# Patient Record
Sex: FEMALE | Race: WHITE
[De-identification: names, ages, dates, MRNs, and addresses within clinical notes are randomized per-mention and may not be internally consistent; named-entity substitution may affect disease eponyms.]

---

## 2017-03-15 ENCOUNTER — HOSPITAL ENCOUNTER (EMERGENCY)
Dept: HOSPITAL 74 - FER | Age: 67
Discharge: HOME | End: 2017-03-15
Payer: COMMERCIAL

## 2017-03-15 VITALS — BODY MASS INDEX: 26.5 KG/M2

## 2017-03-15 VITALS — DIASTOLIC BLOOD PRESSURE: 67 MMHG | HEART RATE: 85 BPM | TEMPERATURE: 98.9 F | SYSTOLIC BLOOD PRESSURE: 118 MMHG

## 2017-03-15 DIAGNOSIS — M79.604: ICD-10-CM

## 2017-03-15 DIAGNOSIS — R41.9: ICD-10-CM

## 2017-03-15 DIAGNOSIS — Z79.01: ICD-10-CM

## 2017-03-15 DIAGNOSIS — Z95.2: ICD-10-CM

## 2017-03-15 DIAGNOSIS — M79.605: ICD-10-CM

## 2017-03-15 DIAGNOSIS — M54.5: Primary | ICD-10-CM

## 2017-03-15 NOTE — PDOC
History of Present Illness





- General


Chief Complaint: Pain, Acute


Stated Complaint: BACK PAIN RADIATING DOWN BOTH LEGS


Time Seen by Provider: 03/15/17 01:39


History Source: Patient


Exam Limitations: No Limitations





- History of Present Illness


Initial Comments: 





03/15/17 01:53


This is a 66-year-old female who comes in complaining of low back pain 1 day. 

Patient said she has a history of spinal stenosis and has been in bed for the 

better part of a week for the influenza. Patient said she got up out of bed 

this evening and had the sensation that her back is went into a spasm and she 

had difficulty walking. Patient said she was unable to go upstairs unable to 

stand and unable to move secondary to the pain. However by the time patient 

arrived in the emergency room the pain and pretty much resolved and she was 

moving and feeling much better. Patient otherwise denies any loss or change in 

bowel or bladder habits. Patient denies any weakness of   her legs. Her 

legs.PAST MEDICAL HISTORY:  no significant history





PAST SURGICAL HISTORY:  no significant history





FAMILY HISTORY:  no pertinant history





SOCIAL HISTORY:  Pt lives with  family and is employed.





MEDICATIONS:  reviewed





ALLERGIES:  As per nursing notes





Review of Systems


General:  No fevers or chills, no weakness, no weight loss 


HEENT: No change in vision.  No sore throat,. No ear pain


CardioVascular:  No chest pain or shortness of breath


Respiratory:No cough, or wheezing. 


Gastrointestinal:  no nausea, vomitting, diarrhea or constipation,  No rectal 

bleeding


Genitourinary:  No dysuria, hematuria, or frequency


Musculoskeletal:  No joint or muscle pain or swelling


Neurologic: No headache, vertigo, dizziness or loss of consciousness


Psychiatric: nor depression 


Skin: No rashes or easy bruising


Endocrine: no increased thirst or abnormal weight change


Allergic: no skin or latex allergy


All other systems reviewed and normal





GENERAL: The patient is awake, alert, and fully oriented, in no acute distress.


HEAD: Normal with no signs of trauma.


EYES: Pupils equal, round and reactive to light, extraocular movements intact, 

sclera anicteric, conjunctiva clear.


EXTREMITIES: Normal range of motion, no edema.


BACK: There is some tenderness on palpation bilaterally lower back and over the 

sciatic notch bilaterally. There is full range of motion of the hips and legs 

with minimal back pain. Patient denies any low back pain on straight leg 

raising. Neurovascular is intact.


NEUROLOGICAL: Normal speech, normal gait.


PSYCH: Normal mood, normal affect.


SKIN: Warm, Dry, normal turgor, no rashes or lesions noted.





Assessment and plan:


This is a 66-year-old female who comes in complaining of low back pain prior to 

arrival in the emergency room however by the time she got here her pain is 

reported much resolved. Patient was reassured that because of her spinal 

stenosis and she been laying in bed for approximately a week to that most 

likely she had moved wrong and that resulted in her pain. Patient was reassured 

that if it happens again to work through it and work it out with slow gentle 

movement and stretching. Patient was told she can take Tylenol and follow-up 

with her primary care doctor.

















Past History





- Past Medical History


Allergies/Adverse Reactions: 


 Allergies











Allergy/AdvReac Type Severity Reaction Status Date / Time


 


No Known Allergies Allergy   Verified 03/15/17 01:37











Home Medications: 


Ambulatory Orders





Aspirin [Aspirin EC] 81 mg PO DAILY 10/28/15 


Cholecalciferol (Vitamin D3) [Vitamin D] 2,000 unit PO DAILY 10/28/15 


Escitalopram Oxalate [Lexapro -] 10 mg PO DAILY 10/28/15 


Levothyroxine [Synthroid -] 137 mcg PO DAILY 10/28/15 


Metoprolol Succinate [Toprol Xl -] 50 mg PO HS 10/28/15 


Metoprolol Succinate [Toprol Xl -] 75 mg PO AM 10/28/15 


Rosuvastatin Calcium [Crestor] 10 mg PO HS 10/28/15 


Tadalafil [Adcirca] 40 mg PO DAILY 10/28/15 


Warfarin Sodium [Coumadin] 8 mg PO DAILY 10/28/15 








Cancer: Yes (h/o hodgkin's dse tx with radiation)


Cardiac Disorders: Yes


Psychiatric Problems: Yes (anxiety)


Thyroid Disease: Yes





- Surgical History


Cardiac Surgery: Yes (AORTIC VALVE REPLACEMENT 2007)





- Psycho/Social/Smoking Cessation Hx


Anxiety: No


Suicidal Ideation: No


Smoking History: Never smoked


Have you smoked in the past 12 months: No


Information on smoking cessation initiated: No


Hx Alcohol Use: Yes (WINE)


Drug/Substance Use Hx: No


Substance Use Type: None





*Physical Exam





- Vital Signs


 Last Vital Signs











Temp Pulse Resp BP Pulse Ox


 


 98.9 F   85   16   118/67   96 


 


 03/15/17 01:39  03/15/17 01:39  03/15/17 01:39  03/15/17 01:39  03/15/17 01:39














*DC/Admit/Observation/Transfer


Diagnosis at time of Disposition: 


 Low back pain radiating to both legs





- Discharge Dispostion


Disposition: HOME


Condition at time of disposition: Stable


Admit: No





- Referrals


Referrals: 


Shantelle Connolly MD [Primary Care Provider] - 





- Patient Instructions


Printed Discharge Instructions:  DI for Low Back Pain


Additional Instructions: 


 Call  your  DrKely in the morning and talk to  about being able to take any 

anti-inflammatories. Tell you  that you were having low back pain and that 

you were in the emergency room and that the doctor recommended to take an anti-

inflammatory by you were concerned because you're on Coumadin so I recommended 

that you talk to her doctor first.





If her doctor says it is okay to take an anti-inflammatory then you should take 

ibuprofen 2 tablets 3 times a day with food don't take on an empty stomach do 

this for 1 week.





In the meantime U can take acetaminophen 2 extra strength tablets every 4-6 

hours as needed.





If you're not better within 5-7 days follow-up with your doctor or an 

orthopedist. If he needed an orthopedist follow-up with Dr. Larry at [phone]





Return to the emergency department immediately with ANY new, persistent or 

worsening symptoms.





Continue any medications as previously prescribed by your physician.





You should follow up with your primary doctor as soon as possible regarding 

today's emergency department visit.


.


Please make sure your doctor reviews the results of your emergency evaluation.





Thank you for coming to the   Emergency Department today for your care. It was 

a pleasure to see you today. Please note that your evaluation is INCOMPLETE 

until you  follow-up with your doctor.

## 2017-03-15 NOTE — PDOC
History of Present Illness





- General


Chief Complaint: Pain, Acute


Stated Complaint: BACK PAIN RADIATING DOWN BOTH LEGS


Time Seen by Provider: 03/15/17 01:39


History Source: Patient


Exam Limitations: No Limitations





Past History





- Past Medical History


Allergies/Adverse Reactions: 


 Allergies











Allergy/AdvReac Type Severity Reaction Status Date / Time


 


No Known Allergies Allergy   Verified 03/15/17 01:37











Home Medications: 


Ambulatory Orders





Aspirin [Aspirin EC] 81 mg PO DAILY 10/28/15 


Cholecalciferol (Vitamin D3) [Vitamin D] 2,000 unit PO DAILY 10/28/15 


Escitalopram Oxalate [Lexapro -] 10 mg PO DAILY 10/28/15 


Levothyroxine [Synthroid -] 137 mcg PO DAILY 10/28/15 


Metoprolol Succinate [Toprol Xl -] 50 mg PO HS 10/28/15 


Metoprolol Succinate [Toprol Xl -] 75 mg PO AM 10/28/15 


Rosuvastatin Calcium [Crestor] 10 mg PO HS 10/28/15 


Tadalafil [Adcirca] 40 mg PO DAILY 10/28/15 


Warfarin Sodium [Coumadin] 8 mg PO DAILY 10/28/15 








Cancer: Yes (h/o hodgkin's dse tx with radiation)


Cardiac Disorders: Yes


Psychiatric Problems: Yes (anxiety)


Thyroid Disease: Yes





- Surgical History


Cardiac Surgery: Yes (AORTIC VALVE REPLACEMENT 2007)





- Psycho/Social/Smoking Cessation Hx


Anxiety: No


Suicidal Ideation: No


Smoking History: Never smoked


Have you smoked in the past 12 months: No


Hx Alcohol Use: Yes (WINE)


Drug/Substance Use Hx: No


Substance Use Type: None





*DC/Admit/Observation/Transfer





- Discharge Dispostion


Condition at time of disposition: Stable

## 2019-09-11 ENCOUNTER — HOSPITAL ENCOUNTER (OUTPATIENT)
Dept: HOSPITAL 74 - FASU | Age: 69
Discharge: HOME | End: 2019-09-11
Attending: OPHTHALMOLOGY
Payer: COMMERCIAL

## 2019-09-11 VITALS — HEART RATE: 78 BPM | SYSTOLIC BLOOD PRESSURE: 92 MMHG | DIASTOLIC BLOOD PRESSURE: 46 MMHG

## 2019-09-11 VITALS — BODY MASS INDEX: 24.7 KG/M2

## 2019-09-11 VITALS — TEMPERATURE: 98 F

## 2019-09-11 DIAGNOSIS — H26.8: Primary | ICD-10-CM

## 2019-09-11 PROCEDURE — 08RK3JZ REPLACEMENT OF LEFT LENS WITH SYNTHETIC SUBSTITUTE, PERCUTANEOUS APPROACH: ICD-10-PCS | Performed by: OPHTHALMOLOGY

## 2019-09-11 RX ADMIN — TROPICAMIDE ONE DROP: 10 SOLUTION/ DROPS OPHTHALMIC at 09:15

## 2019-09-11 RX ADMIN — TROPICAMIDE ONE DROP: 10 SOLUTION/ DROPS OPHTHALMIC at 09:20

## 2019-09-11 RX ADMIN — CIPROFLOXACIN HYDROCHLORIDE ONE DROP: 3 SOLUTION/ DROPS OPHTHALMIC at 09:10

## 2019-09-11 RX ADMIN — CYCLOPENTOLATE HYDROCHLORIDE ONE DROP: 20 SOLUTION/ DROPS OPHTHALMIC at 09:10

## 2019-09-11 RX ADMIN — CIPROFLOXACIN HYDROCHLORIDE ONE DROP: 3 SOLUTION/ DROPS OPHTHALMIC at 09:15

## 2019-09-11 RX ADMIN — PHENYLEPHRINE HYDROCHLORIDE ONE DROP: 0.25 SPRAY NASAL at 09:10

## 2019-09-11 RX ADMIN — CYCLOPENTOLATE HYDROCHLORIDE ONE DROP: 20 SOLUTION/ DROPS OPHTHALMIC at 09:15

## 2019-09-11 RX ADMIN — CIPROFLOXACIN HYDROCHLORIDE ONE DROP: 3 SOLUTION/ DROPS OPHTHALMIC at 09:20

## 2019-09-11 RX ADMIN — TROPICAMIDE ONE DROP: 10 SOLUTION/ DROPS OPHTHALMIC at 09:10

## 2019-09-11 RX ADMIN — CYCLOPENTOLATE HYDROCHLORIDE ONE DROP: 20 SOLUTION/ DROPS OPHTHALMIC at 09:20

## 2019-09-11 RX ADMIN — PHENYLEPHRINE HYDROCHLORIDE ONE DROP: 0.25 SPRAY NASAL at 09:15

## 2019-09-11 RX ADMIN — PHENYLEPHRINE HYDROCHLORIDE ONE DROP: 0.25 SPRAY NASAL at 09:20

## 2019-09-11 NOTE — OP
DATE OF OPERATION:  09/11/2019

 

OPERATIVE PROCEDURE:  Lens phacoemulsification with posterior chamber intraocular

lens placement left eye.

 

PREOPERATIVE DIAGNOSIS:  Visually significant cataract of left eye.

 

POSTOPERATIVE DIAGNOSIS:  Visually significant cataract of left eye.

 

SURGEON:  Jordon Peralta M.D.

 

ANESTHESIA:  MAC

 

ANESTHESIOLOGIST: 

 

PROCEDURE:  The patient was brought to the operating room and placed under monitored

anesthesia care by Anesthesia.  A drop of Tetracaine was then placed over the left

eye.  The patient was then prepped and draped in the usual sterile manner.  A

speculum was then placed over the left eye. The eye was then well irrigated with

copious amounts of BSS (balanced salt solution). 

 

The operating microscope was then moved into position.  A paracentesis was performed

using a 15 degree blade.  At this point 0.5 mL of 1% preservative-free lidocaine was

injected into the anterior chamber.  Amvisc plus was then injected into the anterior

chamber.  A clear corneal incision was then formed using a 2.2 mm keratome. A

capsulorrhexis was then performed in a continuous circular fashion beginning with a

cystotome, completed with an Utratas forceps. Hydrodissection was then performed

using BSS on a cannula. The phaco probe was then introduced through the corneal wound

and the cataract was removed using the phaco chop technique.  Approximately 3 seconds

of absolute phaco time was used.  The remaining cortex was then removed using

irrigation and aspiration with an I/A probe. The capsule was then filled with regular

Amvisc and the capsule was noted to be intact.  A previously selected foldable

posterior chamber intraocular lens was then injected into the capsule through the

corneal wound using a lens injector.  It was then dialed into position using a

Sinskey hook.  The Amvisc was then removed using irrigation and aspiration.  Miostat

was then injected through the paracentesis to constrict the pupil.  The paracentesis

and corneal wound were then hydrated and noted to be water tight. A drop of Maxitrol

was then placed over the eye.  The speculum was removed and clear shield was taped

over the eye. 

 

The patient tolerated the procedure well and there were no surgical complications.

The patient was asked to follow up in my office the next day. 

 

 

 

JORDON PERALTA M.D.

 

[id number]

DD: 09/11/2019 10:55

DT: 09/11/2019 11:56

Job #:  [id number]

## 2024-06-29 ENCOUNTER — RX ONLY (RX ONLY)
Age: 74
End: 2024-06-29

## 2024-06-29 ENCOUNTER — OFFICE (OUTPATIENT)
Dept: URBAN - METROPOLITAN AREA CLINIC 29 | Facility: CLINIC | Age: 74
Setting detail: OPHTHALMOLOGY
End: 2024-06-29
Payer: MEDICARE

## 2024-06-29 DIAGNOSIS — B00.52: ICD-10-CM

## 2024-06-29 DIAGNOSIS — Z96.1: ICD-10-CM

## 2024-06-29 PROCEDURE — 99203 OFFICE O/P NEW LOW 30 MIN: CPT | Performed by: OPHTHALMOLOGY

## 2024-06-29 ASSESSMENT — CONFRONTATIONAL VISUAL FIELD TEST (CVF)
OS_FINDINGS: FULL
OD_FINDINGS: FULL

## 2024-07-03 ENCOUNTER — OFFICE (OUTPATIENT)
Dept: URBAN - METROPOLITAN AREA CLINIC 29 | Facility: CLINIC | Age: 74
Setting detail: OPHTHALMOLOGY
End: 2024-07-03
Payer: MEDICARE

## 2024-07-03 DIAGNOSIS — B00.52: ICD-10-CM

## 2024-07-03 DIAGNOSIS — Z96.1: ICD-10-CM

## 2024-07-03 PROCEDURE — 99213 OFFICE O/P EST LOW 20 MIN: CPT | Performed by: OPHTHALMOLOGY

## 2024-07-03 ASSESSMENT — CONFRONTATIONAL VISUAL FIELD TEST (CVF)
OS_FINDINGS: FULL
OD_FINDINGS: FULL

## 2024-07-23 ENCOUNTER — OFFICE (OUTPATIENT)
Dept: URBAN - METROPOLITAN AREA CLINIC 29 | Facility: CLINIC | Age: 74
Setting detail: OPHTHALMOLOGY
End: 2024-07-23
Payer: MEDICARE

## 2024-07-23 DIAGNOSIS — H43.813: ICD-10-CM

## 2024-07-23 DIAGNOSIS — H16.422: ICD-10-CM

## 2024-07-23 DIAGNOSIS — B00.52: ICD-10-CM

## 2024-07-23 DIAGNOSIS — D31.32: ICD-10-CM

## 2024-07-23 DIAGNOSIS — H16.042: ICD-10-CM

## 2024-07-23 DIAGNOSIS — Z96.1: ICD-10-CM

## 2024-07-23 PROBLEM — H17.9 CORNEAL SCAR: Status: ACTIVE | Noted: 2024-07-23

## 2024-07-23 PROCEDURE — 99213 OFFICE O/P EST LOW 20 MIN: CPT | Performed by: OPHTHALMOLOGY

## 2024-07-23 ASSESSMENT — CONFRONTATIONAL VISUAL FIELD TEST (CVF)
OS_FINDINGS: FULL
OD_FINDINGS: FULL

## 2024-08-13 ENCOUNTER — OFFICE (OUTPATIENT)
Dept: URBAN - METROPOLITAN AREA CLINIC 30 | Facility: CLINIC | Age: 74
Setting detail: OPHTHALMOLOGY
End: 2024-08-13
Payer: MEDICARE

## 2024-08-13 DIAGNOSIS — H16.223: ICD-10-CM

## 2024-08-13 DIAGNOSIS — B00.52: ICD-10-CM

## 2024-08-13 PROCEDURE — 68761 CLOSE TEAR DUCT OPENING: CPT | Mod: 50 | Performed by: OPHTHALMOLOGY

## 2024-08-13 PROCEDURE — 92012 INTRM OPH EXAM EST PATIENT: CPT | Mod: 25 | Performed by: OPHTHALMOLOGY

## 2024-08-13 ASSESSMENT — CONFRONTATIONAL VISUAL FIELD TEST (CVF)
OD_FINDINGS: FULL
OS_FINDINGS: FULL

## 2024-10-31 ENCOUNTER — OFFICE (OUTPATIENT)
Dept: URBAN - METROPOLITAN AREA CLINIC 29 | Facility: CLINIC | Age: 74
Setting detail: OPHTHALMOLOGY
End: 2024-10-31
Payer: MEDICARE

## 2024-10-31 DIAGNOSIS — H52.4: ICD-10-CM

## 2024-10-31 DIAGNOSIS — B00.52: ICD-10-CM

## 2024-10-31 DIAGNOSIS — Z96.1: ICD-10-CM

## 2024-10-31 DIAGNOSIS — H17.9: ICD-10-CM

## 2024-10-31 PROCEDURE — 99213 OFFICE O/P EST LOW 20 MIN: CPT | Performed by: OPHTHALMOLOGY

## 2024-10-31 PROCEDURE — 92015 DETERMINE REFRACTIVE STATE: CPT | Performed by: OPHTHALMOLOGY

## 2024-10-31 ASSESSMENT — REFRACTION_CURRENTRX
OD_ADD: +2.50
OD_VPRISM_DIRECTION: PROGS
OS_VPRISM_DIRECTION: PROGS
OS_AXIS: 150
OD_SPHERE: -3.25
OS_SPHERE: -0.75
OD_OVR_VA: 20/
OD_AXIS: 005
OS_OVR_VA: 20/
OD_CYLINDER: +1.50
OS_ADD: +2.50
OS_CYLINDER: +1.25

## 2024-10-31 ASSESSMENT — REFRACTION_MANIFEST
OS_CYLINDER: +2.25
OS_ADD: +2.75
OD_VA1: 20/25-2
OD_ADD: +2.75
OS_VA1: 20/30-2
OD_SPHERE: -3.25
OS_AXIS: 155
OS_SPHERE: -1.25
OD_AXIS: 15
OD_CYLINDER: +1.00

## 2024-10-31 ASSESSMENT — VISUAL ACUITY
OS_BCVA: 20/25-1
OD_BCVA: 20/40-1

## 2024-10-31 ASSESSMENT — CONFRONTATIONAL VISUAL FIELD TEST (CVF)
OS_FINDINGS: FULL
OD_FINDINGS: FULL

## 2025-05-05 ENCOUNTER — OFFICE (OUTPATIENT)
Dept: URBAN - METROPOLITAN AREA CLINIC 29 | Facility: CLINIC | Age: 75
Setting detail: OPHTHALMOLOGY
End: 2025-05-05
Payer: MEDICARE

## 2025-05-05 DIAGNOSIS — H16.223: ICD-10-CM

## 2025-05-05 DIAGNOSIS — H17.9: ICD-10-CM

## 2025-05-05 DIAGNOSIS — Z96.1: ICD-10-CM

## 2025-05-05 DIAGNOSIS — H16.422: ICD-10-CM

## 2025-05-05 DIAGNOSIS — B00.52: ICD-10-CM

## 2025-05-05 PROCEDURE — 99213 OFFICE O/P EST LOW 20 MIN: CPT | Performed by: OPHTHALMOLOGY

## 2025-05-05 ASSESSMENT — REFRACTION_CURRENTRX
OD_ADD: +2.50
OD_CYLINDER: +1.50
OS_VPRISM_DIRECTION: PROGS
OD_CYLINDER: +0.75
OS_CYLINDER: +1.25
OD_OVR_VA: 20/
OD_SPHERE: -3.25
OS_AXIS: 150
OD_AXIS: 024
OS_OVR_VA: 20/
OS_ADD: +2.50
OD_VPRISM_DIRECTION: PROGS
OS_SPHERE: -1.25
OD_OVR_VA: 20/
OS_SPHERE: -0.75
OS_AXIS: 155
OD_ADD: +2.75
OS_OVR_VA: 20/
OS_CYLINDER: +2.50
OD_AXIS: 005
OD_SPHERE: -3.50
OS_ADD: +2.50

## 2025-05-05 ASSESSMENT — REFRACTION_MANIFEST
OD_CYLINDER: +1.00
OD_ADD: +2.75
OS_SPHERE: -1.25
OS_CYLINDER: +2.25
OD_SPHERE: -3.25
OS_VA1: 20/30-2
OD_AXIS: 15
OS_ADD: +2.75
OD_VA1: 20/25-2
OS_AXIS: 155

## 2025-05-05 ASSESSMENT — REFRACTION_AUTOREFRACTION
OS_AXIS: 159
OD_CYLINDER: +1.25
OD_SPHERE: -3.50
OS_CYLINDER: +1.50
OS_SPHERE: -1.00
OD_AXIS: 018

## 2025-05-05 ASSESSMENT — VISUAL ACUITY
OD_BCVA: 20/20-2
OS_BCVA: 20/25

## 2025-05-05 ASSESSMENT — TONOMETRY
OD_IOP_MMHG: 17
OS_IOP_MMHG: 16

## 2025-05-05 ASSESSMENT — CONFRONTATIONAL VISUAL FIELD TEST (CVF)
OS_FINDINGS: FULL
OD_FINDINGS: FULL

## 2025-05-05 ASSESSMENT — VASCULARIZATION: OS_VASCULARIZATION: SUPERIOR SUB-EPITHELIAL
